# Patient Record
(demographics unavailable — no encounter records)

---

## 2024-11-22 NOTE — PHYSICAL EXAM
[Chaperone Present] : A chaperone was present in the examining room during all aspects of the physical examination [18632] : A chaperone was present during the pelvic exam. [Appropriately responsive] : appropriately responsive [Oriented x3] : oriented x3

## 2024-11-22 NOTE — HISTORY OF PRESENT ILLNESS
[FreeTextEntry1] : This visit was provided via telehealth using real time 2 way av technology. The patient Gi Moreira was located at home at the time of the visit. The provider Dr. Erin Anguiano was located at the medical office located in [address] [place] [address] at the time of the visit. The patient and provider participated in the telehealth visit. Verbal consent for the telehealth visit was given by the patient on 11/22/24.   Doing well on 1.5 dose of Lexapro--would like to increase to 20---interested in speaking with psychiatrist for potential dx of ADHD

## 2024-11-25 NOTE — ASSESSMENT
[FreeTextEntry1] : The patient has fatigue, weight gain and insomnia so I ordered labs to evaluate for Cushing's syndrome, hyper or hypothyroidism and adrenal insufficiency.   Plan: 1. Labs to be done this week - 8 am to 10 am - midnight salivary cortisol, TSH, Free T4, ACTH, serum cortisol 2. Follow up in 2 weeks to review results - telehealth visit ok

## 2024-11-25 NOTE — HISTORY OF PRESENT ILLNESS
[FreeTextEntry1] : Problems: 1. Fatigue 2. Weight gain 3. Insomnia  Fatigue/Weight gain/Insomnia 1. The patient complained of fatigue, weight gain and insomnia on her initial visit with me on 11/25/2024. 2. Labs: 08/14/2024 - FSH N, LH not done, estradiol N, TSH N, prolactin N, total and free testosterone N 3. 11/25/2024 - Weight 140 pounds, BMI 27.3 4. Mother has hypothyroidism, no family history of thyroid cancer, no personal history of radiation treatment 5. Meds: Patient is on Loestrin (contains estrogen)

## 2024-11-25 NOTE — REVIEW OF SYSTEMS
[FreeTextEntry2] : Constitutional: No Fever Eyes: No visual difficulty ENT: no difficulty hearing Cardiovascular: No palpitations Respiratory: No Cough Gastrointestinal: No nausea Genitourinary: No dysuria Musculoskeletal: No joint pain Neurological: No headaches Psychiatry: Has insomnia Skin: No rashes Hematology: No bleeding

## 2024-11-25 NOTE — PHYSICAL EXAM
[de-identified] : General: No distress, well nourished Eyes: Normal Sclera, EOMI, PERRL ENT: Normal appearance of the nose, normal oropharynx Neck/Thyroid: No cervical lymphadenopathy, thyroid gland 20 g in size, no thyroid nodules, non-tender Respiratory: No use of accessory muscles of respiration, vesicular breath sounds heard bilaterally, no crepitations or rhonchi Cardiovascular: S1 and S2 heard and normal, no S3 or S4, no murmurs, radial pulse normal bilaterally Abdomen: soft, non-tender, no masses, normal bowel sounds Musculoskeletal: No swelling or deformities of joints of hands, no pedal edema Neurological: Normal range of motion in the hands, Normal brachioradialis reflexes bilaterally Psychiatry: Patient converses normally, good judgement and insight Skin: No rashes in hands, no nodules palpated in hands

## 2025-04-11 NOTE — ASSESSMENT
[FreeTextEntry1] : The patient has fatigue, weight gain and insomnia - Endocrine causes of these are Cushing's syndrome, hyper or hypothyroidism and adrenal insufficiency.  Labs from 04/01/2025 revealed the patient does not have Cushing's syndrome. Labs from 03/31/2025 indicates she does not have hyperthyroidism - however, TSH was normal and Free T4 was low so I ordered a Free T4 via equilibrium dialysis - I also ordered a 250 micrograms cosyntropin stimulation test to evaluate for adrenal insufficiency.    Plan: 1. Labs to be done this week - TSH, Free T4, Free T4 via equilibrium dialysis 2. I also ordered a 250 micrograms cosyntropin stimulation - patient says she will do this in the week of May 5th 2025 - no baseline needed 3. Follow up in 2 days after cosyntropin stimulation test done to review results - telehealth visit ok

## 2025-04-11 NOTE — REASON FOR VISIT
[Home] : at home, [unfilled] , at the time of the visit. [Medical Office: ([facility])___] : at the medical office located in  [Telehealth (audio & video)] : This visit was provided via telehealth using real-time 2-way audio visual technology. [Verbal consent obtained from patient] : the patient, [unfilled] [Follow - Up] : a follow-up visit [Other___] : [unfilled]

## 2025-04-11 NOTE — HISTORY OF PRESENT ILLNESS
[FreeTextEntry1] : Problems: 1. Fatigue 2. Weight gain 3. Insomnia  Fatigue/Weight gain/Insomnia 1. The patient complained of fatigue, weight gain and insomnia on her initial visit with me on 11/25/2024. 2. Labs: 08/14/2024 - FSH N, LH not done, estradiol N, TSH N, prolactin N, total and free testosterone N 03/31/2025 - 7:51 am - TSH N, Free T4 - 0.6 (0.9 to 1.7), ACTH 18 (6 to 48), cortisol 11 04/01/2025 - Midnight salivary cortisol < 50 ng/dl  3. 11/25/2024 - Weight 140 pounds, BMI 27.3 4. Mother has hypothyroidism, no family history of thyroid cancer, no personal history of radiation treatment 5. Meds: Patient is on Loestrin (contains estrogen)

## 2025-04-11 NOTE — PHYSICAL EXAM
[de-identified] : General: No distress, well nourished Eyes: Normal external appearance ENT: Normal appearance of the nose Neck/Thyroid: No visible neck swelling Respiratory: No use of accessory muscles of respiration Psychiatry: Patient converses normally, good judgement and insight Skin: No rashes seen on face

## 2025-04-18 NOTE — HISTORY OF PRESENT ILLNESS
[de-identified] : 32 y/o F PMHx anxiety depression (Lexapro) presents for routine PE. Will be worked for ADD as she doesn't focus well.  Procrastinates and easily distracted.  In the process of endo workup for weight gain and fatigue. Tired all the time . needs to sleep alot nap during daytime. Had PNA few months ago.

## 2025-04-18 NOTE — HEALTH RISK ASSESSMENT
[Very Good] : ~his/her~  mood as very good [Yes] : Yes [2 - 3 times a week (3 pts)] : 2 - 3  times a week (3 points) [1 or 2 (0 pts)] : 1 or 2 (0 points) [No falls in past year] : Patient reported no falls in the past year [1] : 2) Feeling down, depressed, or hopeless for several days (1) [PHQ-2 Positive] : PHQ-2 Positive [I have developed a follow-up plan documented below in the note.] : I have developed a follow-up plan documented below in the note. [Time Spent: ___ Minutes] : I spent [unfilled] minutes performing a depression screening for this patient. [FreeTextEntry1] : weight gain in 6 months [de-identified] : gym 2-3 x week [[id number]] : 2

## 2025-05-14 NOTE — HISTORY OF PRESENT ILLNESS
[FreeTextEntry1] : 32 yo here today for annual---co 20+ lb weight gain in a few months---being worked up by endo. Menses regular. 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 Interested in GLP-1 meds, to have complete endo giang and fu in office.

## 2025-05-14 NOTE — PHYSICAL EXAM
[MA] : MA [FreeTextEntry2] : Mary HOLLAND [Appropriately responsive] : appropriately responsive [Alert] : alert [No Acute Distress] : no acute distress [No Lymphadenopathy] : no lymphadenopathy [Soft] : soft [Non-tender] : non-tender [Non-distended] : non-distended [No HSM] : No HSM [No Lesions] : no lesions [No Mass] : no mass [Oriented x3] : oriented x3 [Examination Of The Breasts] : a normal appearance [No Masses] : no breast masses were palpable [Labia Majora] : normal [Labia Minora] : normal [Normal] : normal [Uterine Adnexae] : normal

## 2025-05-29 NOTE — PHYSICAL EXAM
[de-identified] : General: No distress, well nourished Eyes: Normal external appearance ENT: Normal appearance of the nose Neck/Thyroid: No visible neck swelling Respiratory: No use of accessory muscles of respiration Psychiatry: Patient converses normally, good judgement and insight Skin: No rashes seen on face

## 2025-05-29 NOTE — REASON FOR VISIT
[Home] : at home, [unfilled] , at the time of the visit. [Medical Office: ([facility])___] : at the medical office located in  [Telehealth (audio & video)] : This visit was provided via telehealth using real-time 2-way audio visual technology. [Verbal consent obtained from patient] : the patient, [unfilled] [Follow - Up] : a follow-up visit [Hypothyroidism] : hypothyroidism [Pituitary Evaluation/ Disorder] : pituitary evaluation/disorder

## 2025-05-29 NOTE — ASSESSMENT
[FreeTextEntry1] : The patient had fatigue, weight gain and insomnia on her initial visit with me in November 2024 - Endocrine causes of these are Cushing's syndrome, hyper or hypothyroidism and adrenal insufficiency.  Labs from 04/01/2025 revealed the patient does not have Cushing's syndrome. Labs from 03/31/2025 indicates she does not have hyperthyroidism. Labs from 04/11/2025 indicates she has central hypothyroidism so I prescribed levothyroxine on 05/07/2025. MRI pituitary gland on 05/14/2025 revealed the patient has a pituitary microadenoma - NEXT MRI PITUITARY GLAND DUE IN MAY/JUNE 2026.  Evaluation of the hypothalamic pituitary axes. 1. Adrenal - April 2025 - midnight salivary cortisol level Normal so no Cushing's syndrome, May 2025 - 250 micrograms cosyntropin stimulation test normal so no adrenal insufficiency 2. Prolactin - May 2025 - prolactin N, macroprolactin % normal 3. Gonadal - May 2025 - alpha subunit N, has central hypogonadism - PATIENT TO FOLLOW UP WITH GYNECOLOGY FOR HER CENTRAL HYPOGONADISM.  4. Growth Hormone - May 2025 - [facility] IGF-1 level N, [facility] IGF-1 level low at 77 (83 to 280) - since [facility] lab is more accurate and the [facility] IGF-1 level was normal, then patient's growth hormone axis is normal 5. Thyroid - April 2025 - patient has central hypothyroidism 6. Posterior pituitary gland - May 2025 - serum Na N   Plan: 1. Continue levothyroxine (generic) 50 micrograms po daily 2. Patient to contact gynecology re central hypogonadism 3. Patient declined to have further follow up with me so I did not order a repeat Free T4 level - patient says she will follow up with another Endocrinologist for now and will no longer follow up with our office.

## 2025-05-29 NOTE — HISTORY OF PRESENT ILLNESS
[FreeTextEntry1] : Problems: 1. Central hypothyroidism 2. Central hypogonadism 3. Pituitary microadenoma   Central Hypothyroidism/Central hypogonadism/Pituitary microadenoma 1. The patient complained of fatigue, weight gain and insomnia on her initial visit with me on 11/25/2024. Patient was diagnosed with central hypothyroidism on 05/07/2025 2. Labs: 08/14/2024 - FSH N, LH not done, estradiol N, TSH N, prolactin N, total and free testosterone N 03/31/2025 - 7:51 am - TSH N, Free T4 - 0.6 (0.9 to 1.7), ACTH 18 (6 to 48), cortisol 11 04/01/2025 - Midnight salivary cortisol < 50 ng/dl  04/11/2025 - TSH N, Free T4 - 0.7 (0.9 to 1.8), Free T4 via dialysis 0.52 (0.8 to 1.7) May 2025 - 250 micrograms cosyntropin stimulation test normal, prolactin N, macroprolactin % normal, alpha subunit N, has central hypogonadism, [facility] IGF-1 level N, [facility] IGF-1 level low at 77 (83 to 280), Na N 3. Radiology: 05/19/2025 - MRI pituitary gland - pituitary stalk is midline, there is a subcentimeter (4.1 x 3.2 mm) pituitary lesion in the right pituitary gland that is compatible with a pituitary microadenoma.  4. 11/25/2024 - Weight 140 pounds, BMI 27.3 5. Symptoms - has regular menstrual cycles 6.  Mother has hypothyroidism, no family history of thyroid cancer, no personal history of radiation treatment 7. Meds: Patient was on Loestrin (contains estrogen) and she stopped this in October 2024 On levothyroxine (generic) 50 micrograms po daily

## 2025-06-02 NOTE — HISTORY OF PRESENT ILLNESS
[Home] : at home, [unfilled] , at the time of the visit. [Medical Office: ([facility])___] : at the medical office located in  [Telehealth (audio & video)] : This visit was provided via telehealth using real-time 2-way audio visual technology. [Verbal consent obtained from patient] : the patient, [unfilled] [de-identified] : 32 y/o F PMHx Anxiety Depression presents via telehealth for f/up. Pt gained more weight . Stopped Lexapro. Saw Endo and was diagnosed with pituitary adenoma and "cental hypothyroid). Put on Levothyroxine 50mg. Still gaining cindy  despite dieting and exercising.  Diagnosed with Hypogonadism. Requests second opinion endo consult

## 2025-06-20 NOTE — PLAN
[FreeTextEntry1] : 30 mins were spent: - Reviewing the patient chart, past history, labs, images, medications - counseling the patient as to the risks, benefits, and alternatives of treatment - counseling patient on follow-up appointments and treatment course - documenting salient information during this encounter  fu 4-6 weeks

## 2025-06-20 NOTE — HISTORY OF PRESENT ILLNESS
[FreeTextEntry1] : pituitary adenoma--seeing endo  Menstruating regularly  Weight management visit.  Patient notes that she does not feel well at her current weight and despite healthy eating habits and regular exercise, she has not been able to lose weight.  We have discussed the mediterranean diet and creating more of a caloric deficit while maintaining a regular cardio and strength training regimen.  We have also discussed the potential aid from semaglutide/tirzapeptide medications. Patient denies family history of medullary thyroid ca.  Discussed avoiding alcohol while on these medications and to continue maintaining a healthy diet and exercise plan.   If initiated, patient will inject once weekly with fu monthly for increasing dosage.  Discussed continuing monitoring labs every 3, 6, 9 mo while on medications  Common symptoms include nausea, vomiting, headache, allergic reaction, diarrhea, and sometimes constipation.